# Patient Record
Sex: FEMALE | Race: BLACK OR AFRICAN AMERICAN | NOT HISPANIC OR LATINO | Employment: FULL TIME | URBAN - METROPOLITAN AREA
[De-identification: names, ages, dates, MRNs, and addresses within clinical notes are randomized per-mention and may not be internally consistent; named-entity substitution may affect disease eponyms.]

---

## 2022-07-20 ENCOUNTER — OFFICE VISIT (OUTPATIENT)
Dept: PEDIATRICS | Facility: CLINIC | Age: 27
End: 2022-07-20
Payer: COMMERCIAL

## 2022-07-20 VITALS
WEIGHT: 132.5 LBS | HEART RATE: 72 BPM | RESPIRATION RATE: 16 BRPM | TEMPERATURE: 98.2 F | DIASTOLIC BLOOD PRESSURE: 70 MMHG | HEIGHT: 67 IN | SYSTOLIC BLOOD PRESSURE: 112 MMHG | OXYGEN SATURATION: 100 % | BODY MASS INDEX: 20.8 KG/M2

## 2022-07-20 DIAGNOSIS — Z00.00 ROUTINE GENERAL MEDICAL EXAMINATION AT A HEALTH CARE FACILITY: Primary | ICD-10-CM

## 2022-07-20 DIAGNOSIS — Z23 HIGH PRIORITY FOR 2019-NCOV VACCINE: ICD-10-CM

## 2022-07-20 DIAGNOSIS — Z91.018 FOOD ALLERGY: ICD-10-CM

## 2022-07-20 DIAGNOSIS — Z13.220 SCREENING FOR HYPERLIPIDEMIA: ICD-10-CM

## 2022-07-20 DIAGNOSIS — R23.3 EASY BRUISABILITY: ICD-10-CM

## 2022-07-20 LAB
ERYTHROCYTE [DISTWIDTH] IN BLOOD BY AUTOMATED COUNT: 13.3 % (ref 10–15)
HCT VFR BLD AUTO: 39.9 % (ref 35–47)
HGB BLD-MCNC: 13.9 G/DL (ref 11.7–15.7)
MCH RBC QN AUTO: 29.4 PG (ref 26.5–33)
MCHC RBC AUTO-ENTMCNC: 34.8 G/DL (ref 31.5–36.5)
MCV RBC AUTO: 84 FL (ref 78–100)
PLATELET # BLD AUTO: 266 10E3/UL (ref 150–450)
RBC # BLD AUTO: 4.73 10E6/UL (ref 3.8–5.2)
WBC # BLD AUTO: 4.4 10E3/UL (ref 4–11)

## 2022-07-20 PROCEDURE — 80048 BASIC METABOLIC PNL TOTAL CA: CPT | Performed by: STUDENT IN AN ORGANIZED HEALTH CARE EDUCATION/TRAINING PROGRAM

## 2022-07-20 PROCEDURE — 80061 LIPID PANEL: CPT | Performed by: STUDENT IN AN ORGANIZED HEALTH CARE EDUCATION/TRAINING PROGRAM

## 2022-07-20 PROCEDURE — 36415 COLL VENOUS BLD VENIPUNCTURE: CPT | Performed by: STUDENT IN AN ORGANIZED HEALTH CARE EDUCATION/TRAINING PROGRAM

## 2022-07-20 PROCEDURE — 91306 COVID-19,PF,MODERNA (18+ YRS BOOSTER .25ML): CPT | Performed by: STUDENT IN AN ORGANIZED HEALTH CARE EDUCATION/TRAINING PROGRAM

## 2022-07-20 PROCEDURE — 99385 PREV VISIT NEW AGE 18-39: CPT | Mod: GC | Performed by: STUDENT IN AN ORGANIZED HEALTH CARE EDUCATION/TRAINING PROGRAM

## 2022-07-20 PROCEDURE — 85027 COMPLETE CBC AUTOMATED: CPT | Performed by: STUDENT IN AN ORGANIZED HEALTH CARE EDUCATION/TRAINING PROGRAM

## 2022-07-20 PROCEDURE — 0064A COVID-19,PF,MODERNA (18+ YRS BOOSTER .25ML): CPT | Performed by: STUDENT IN AN ORGANIZED HEALTH CARE EDUCATION/TRAINING PROGRAM

## 2022-07-20 SDOH — ECONOMIC STABILITY: TRANSPORTATION INSECURITY
IN THE PAST 12 MONTHS, HAS LACK OF TRANSPORTATION KEPT YOU FROM MEETINGS, WORK, OR FROM GETTING THINGS NEEDED FOR DAILY LIVING?: NO

## 2022-07-20 SDOH — ECONOMIC STABILITY: FOOD INSECURITY: WITHIN THE PAST 12 MONTHS, THE FOOD YOU BOUGHT JUST DIDN'T LAST AND YOU DIDN'T HAVE MONEY TO GET MORE.: NEVER TRUE

## 2022-07-20 SDOH — HEALTH STABILITY: PHYSICAL HEALTH: ON AVERAGE, HOW MANY DAYS PER WEEK DO YOU ENGAGE IN MODERATE TO STRENUOUS EXERCISE (LIKE A BRISK WALK)?: 3 DAYS

## 2022-07-20 SDOH — ECONOMIC STABILITY: FOOD INSECURITY: WITHIN THE PAST 12 MONTHS, YOU WORRIED THAT YOUR FOOD WOULD RUN OUT BEFORE YOU GOT MONEY TO BUY MORE.: NEVER TRUE

## 2022-07-20 SDOH — HEALTH STABILITY: PHYSICAL HEALTH: ON AVERAGE, HOW MANY MINUTES DO YOU ENGAGE IN EXERCISE AT THIS LEVEL?: 20 MIN

## 2022-07-20 SDOH — ECONOMIC STABILITY: INCOME INSECURITY: IN THE LAST 12 MONTHS, WAS THERE A TIME WHEN YOU WERE NOT ABLE TO PAY THE MORTGAGE OR RENT ON TIME?: NO

## 2022-07-20 SDOH — ECONOMIC STABILITY: INCOME INSECURITY: HOW HARD IS IT FOR YOU TO PAY FOR THE VERY BASICS LIKE FOOD, HOUSING, MEDICAL CARE, AND HEATING?: NOT HARD AT ALL

## 2022-07-20 SDOH — ECONOMIC STABILITY: TRANSPORTATION INSECURITY
IN THE PAST 12 MONTHS, HAS THE LACK OF TRANSPORTATION KEPT YOU FROM MEDICAL APPOINTMENTS OR FROM GETTING MEDICATIONS?: NO

## 2022-07-20 ASSESSMENT — ENCOUNTER SYMPTOMS
ABDOMINAL PAIN: 0
COUGH: 0
HEARTBURN: 0
HEMATOCHEZIA: 0
CONSTIPATION: 0
FEVER: 0
BREAST MASS: 0
ARTHRALGIAS: 0
NAUSEA: 0
DIARRHEA: 0
NERVOUS/ANXIOUS: 0
MYALGIAS: 0
CHILLS: 0
FREQUENCY: 0
SHORTNESS OF BREATH: 0
DYSURIA: 0
WEAKNESS: 0
DIZZINESS: 0
HEMATURIA: 0
JOINT SWELLING: 0
EYE PAIN: 0
PARESTHESIAS: 0
SORE THROAT: 0
HEADACHES: 0
PALPITATIONS: 0

## 2022-07-20 ASSESSMENT — SOCIAL DETERMINANTS OF HEALTH (SDOH)
HOW OFTEN DO YOU ATTEND CHURCH OR RELIGIOUS SERVICES?: NEVER
IN A TYPICAL WEEK, HOW MANY TIMES DO YOU TALK ON THE PHONE WITH FAMILY, FRIENDS, OR NEIGHBORS?: MORE THAN THREE TIMES A WEEK
HOW OFTEN DO YOU GET TOGETHER WITH FRIENDS OR RELATIVES?: ONCE A WEEK
ARE YOU MARRIED, WIDOWED, DIVORCED, SEPARATED, NEVER MARRIED, OR LIVING WITH A PARTNER?: NEVER MARRIED
DO YOU BELONG TO ANY CLUBS OR ORGANIZATIONS SUCH AS CHURCH GROUPS UNIONS, FRATERNAL OR ATHLETIC GROUPS, OR SCHOOL GROUPS?: NO

## 2022-07-20 ASSESSMENT — LIFESTYLE VARIABLES
SKIP TO QUESTIONS 9-10: 1
HOW OFTEN DO YOU HAVE SIX OR MORE DRINKS ON ONE OCCASION: NEVER
HOW MANY STANDARD DRINKS CONTAINING ALCOHOL DO YOU HAVE ON A TYPICAL DAY: 1 OR 2
AUDIT-C TOTAL SCORE: 3
HOW OFTEN DO YOU HAVE A DRINK CONTAINING ALCOHOL: 2-3 TIMES A WEEK

## 2022-07-20 ASSESSMENT — PAIN SCALES - GENERAL: PAINLEVEL: MODERATE PAIN (5)

## 2022-07-20 NOTE — PROGRESS NOTES
SUBJECTIVE:   CC: Cinthya Thakkar is an 26 year old woman who presents for preventive health visit.     Intermittent pleuritic pain at rest, less than a minute in duration. This occurs more often at rest, 1-2 times a week for the past few months. No pain with moderate to intense exercise.     Noticing increased bruising the past several months. No bleeding of gums, no heavier periods. No family history of coagulopathies. She does have sickle cell trait.     Has food allergies - apples, sugar snap peas, raw carrots. Feels that her lips and throat are itchy, occasional shortness of breath.     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     Got her PAP earlier this month at [facility] and reported it to be wnl.     Patient has been advised of split billing requirements and indicates understanding: Yes  Healthy Habits:     Getting at least 3 servings of Calcium per day:  NO    Bi-annual eye exam:  Yes    Dental care twice a year:  Yes    Sleep apnea or symptoms of sleep apnea:  None    Diet:  Regular (no restrictions)    Frequency of exercise:  4-5 days/week    Duration of exercise:  30-45 minutes    Taking medications regularly:  Yes    Medication side effects:  None    PHQ-2 Total Score: 0    Additional concerns today:  Yes    Chest Pain      Onset: couple of months     Description (location/character/radiation/duration): left side     Intensity:  Moderate sharp pain     Accompanying signs and symptoms:        Shortness of breath: no        Sweating: no        Nausea/vomitting: no        Palpitations: no        Other (fevers/chills/cough/heartburn/lightheadedness): YES- painful to take a deep breath     History (similar episodes/previous evaluation): None    Precipitating or alleviating factors:       Worse with exertion: no        Worse with breathing: YES- difficult to take a deep breath        Related to eating: no        Better with burping: no     Therapies tried and outcome: None      Other concerns are referral for food allergist and increase in bruising     Today's PHQ-2 Score:   PHQ-2 ( 1999 Pfizer) 7/20/2022   Q1: Little interest or pleasure in doing things 0   Q2: Feeling down, depressed or hopeless 0   PHQ-2 Score 0   Q1: Little interest or pleasure in doing things Not at all   Q2: Feeling down, depressed or hopeless Not at all   PHQ-2 Score 0       Abuse: Current or Past (Physical, Sexual or Emotional) - No  Do you feel safe in your environment? Yes    Have you ever done Advance Care Planning? (For example, a Health Directive, POLST, or a discussion with a medical provider or your loved ones about your wishes): Yes, patient states has an Advance Care Planning document and will bring a copy to the clinic.    Social History     Tobacco Use     Smoking status: Never Smoker     Smokeless tobacco: Never Used   Substance Use Topics     Alcohol use: Yes     Comment: 1-2 daily, 3 days a week     If you drink alcohol do you typically have >3 drinks per day or >7 drinks per week? No    Alcohol Use 7/20/2022   Prescreen: >3 drinks/day or >7 drinks/week? No   Prescreen: >3 drinks/day or >7 drinks/week? -     Reviewed orders with patient.  Reviewed health maintenance and updated orders accordingly - Yes    Breast Cancer Screening: NA    History of abnormal Pap smear: NO - age 21-29 PAP every 3 years recommended. Reported negative earlier this month at [facility].     Reviewed and updated as needed this visit by clinical staff   Tobacco  Allergies  Meds   Med Hx  Surg Hx  Fam Hx  Soc Hx          Reviewed and updated as needed this visit by Provider    Allergies  Meds      Soc Hx           Review of Systems   Constitutional: Negative for chills and fever.   HENT: Negative for congestion, ear pain, hearing loss and sore throat.    Eyes: Negative for pain and visual disturbance.   Respiratory: Negative for cough and shortness of breath.    Cardiovascular: Negative for chest pain,  "palpitations and peripheral edema.   Gastrointestinal: Negative for abdominal pain, constipation, diarrhea, heartburn, hematochezia and nausea.   Breasts:  Negative for tenderness, breast mass and discharge.   Genitourinary: Negative for dysuria, frequency, genital sores, hematuria, pelvic pain, urgency, vaginal bleeding and vaginal discharge.   Musculoskeletal: Negative for arthralgias, joint swelling and myalgias.   Skin: Negative for rash.   Neurological: Negative for dizziness, weakness, headaches and paresthesias.   Psychiatric/Behavioral: Negative for mood changes. The patient is not nervous/anxious.      OBJECTIVE:   /70   Pulse 72   Temp 98.2  F (36.8  C)   Resp 16   Ht 1.713 m (5' 7.44\")   Wt 60.1 kg (132 lb 8 oz)   SpO2 100%   BMI 20.48 kg/m       Physical Exam  GENERAL: healthy, alert and no distress  EYES: Eyes grossly normal to inspection, PERRL and conjunctivae and sclerae normal  HENT: ear canals and TM's normal, nose and mouth without ulcers or lesions  NECK: no adenopathy, no asymmetry, masses, or scars and thyroid normal to palpation  RESP: lungs clear to auscultation - no rales, rhonchi or wheezes  CV: RRR, no murmur appreciated, well perfused  ABDOMEN: soft, nontender, no hepatosplenomegaly, no masses and bowel sounds normal  MS: no gross musculoskeletal defects noted, no edema  SKIN: no suspicious lesions or rashes  NEURO: Normal strength and tone, mentation intact and speech normal  PSYCH: mentation appears normal, affect normal/bright    ASSESSMENT/PLAN:   Cinthya was seen today for physical and imm/inj.  Diagnoses and all orders for this visit:    Routine general medical examination at a health care facility  -     Basic metabolic panel  (Ca, Cl, CO2, Creat, Gluc, K, Na, BUN); Future    Screening for hyperlipidemia  -     Lipid panel reflex to direct LDL Fasting; Future    Easy bruisability  Patient reports easy bruisability without any other concerning bleeding such as gum " "oozing or heavier periods.   -     CBC with platelets; Future    Food allergy  -     Adult Allergy/Asthma Referral; Future    High priority for 2019-nCoV vaccine  -     COVID-19,PF,MODERNA (18+ Yrs BOOSTER .25mL)    Patient has been advised of split billing requirements and indicates understanding: Yes    COUNSELING:  Reviewed preventive health counseling, as reflected in patient instructions       Regular exercise       Healthy diet/nutrition       Vision screening       Contraception    Estimated body mass index is 20.48 kg/m  as calculated from the following:    Height as of this encounter: 1.713 m (5' 7.44\").    Weight as of this encounter: 60.1 kg (132 lb 8 oz).    She reports that she has never smoked. She has never used smokeless tobacco.    Counseling Resources:  ATP IV Guidelines  Pooled Cohorts Equation Calculator  Breast Cancer Risk Calculator  BRCA-Related Cancer Risk Assessment: FHS-7 Tool  FRAX Risk Assessment  ICSI Preventive Guidelines  Dietary Guidelines for Americans, 2010  USDA's MyPlate  ASA Prophylaxis  Lung CA Screening    The patient was seen by and discussed with Dr. Dunn.     Heaven Henao MD  [employer] [employer]    STAFF NOTE:  I have seen the patient, discussed with the resident, was present during critical portion of visit, and was available to furnish services throughout the visit.  I agree with the history, physical and plan as documented above.    Thea Dunn MD  Internal Medicine-Pediatrics      "

## 2022-07-21 LAB
ANION GAP SERPL CALCULATED.3IONS-SCNC: 6 MMOL/L (ref 3–14)
BUN SERPL-MCNC: 10 MG/DL (ref 7–30)
CALCIUM SERPL-MCNC: 9.3 MG/DL (ref 8.5–10.1)
CHLORIDE BLD-SCNC: 107 MMOL/L (ref 94–109)
CHOLEST SERPL-MCNC: 309 MG/DL
CO2 SERPL-SCNC: 23 MMOL/L (ref 20–32)
CREAT SERPL-MCNC: 0.79 MG/DL (ref 0.52–1.04)
FASTING STATUS PATIENT QL REPORTED: ABNORMAL
GFR SERPL CREATININE-BSD FRML MDRD: >90 ML/MIN/1.73M2
GLUCOSE BLD-MCNC: 99 MG/DL (ref 70–99)
HDLC SERPL-MCNC: 88 MG/DL
LDLC SERPL CALC-MCNC: 212 MG/DL
NONHDLC SERPL-MCNC: 221 MG/DL
POTASSIUM BLD-SCNC: 4.5 MMOL/L (ref 3.4–5.3)
SODIUM SERPL-SCNC: 136 MMOL/L (ref 133–144)
TRIGL SERPL-MCNC: 46 MG/DL

## 2022-08-20 ENCOUNTER — MYC MEDICAL ADVICE (OUTPATIENT)
Dept: PEDIATRICS | Facility: CLINIC | Age: 27
End: 2022-08-20

## 2022-08-22 ENCOUNTER — NURSE TRIAGE (OUTPATIENT)
Dept: PEDIATRICS | Facility: CLINIC | Age: 27
End: 2022-08-22

## 2022-08-22 NOTE — TELEPHONE ENCOUNTER
Attempted to reach patient, LVMTCB. Needs triage on symptoms within [facility] 8/20/22.     Mack WILLIAM RN

## 2022-08-22 NOTE — TELEPHONE ENCOUNTER
"Appt made for Tuesday 8/23/22 for evaluation of dizziness  Educated  pt on when to seek ER care    Pt verbalized understanding and agrees to the plan    Thank you  Cb Pino RN on 8/22/2022 at 2:12 PM    Reason for Disposition    Dizziness is main symptom    MILD dizziness (e.g., walking normally) and has NOT been evaluated by physician for this (Exception: dizziness caused by heat exposure, sudden standing, or poor fluid intake)    Answer Assessment - Initial Assessment Questions  1. SYMPTOM: \"What is the main symptom you are concerned about?\" (e.g., weakness, numbness)      lightheadedness at night for the last 5-6 days. Feels like things around her are moving  2. ONSET: \"When did this start?\" (minutes, hours, days; while sleeping)      5-6 days  3. LAST NORMAL: \"When was the last time you (the patient) were normal (no symptoms)?\"      Episodes last less than a minutes  4. PATTERN \"Does this come and go, or has it been constant since it started?\"  \"Is it present now?\"      Comes goes away.  5. CARDIAC SYMPTOMS: \"Have you had any of the following symptoms: chest pain, difficulty breathing, palpitations?\"      no  6. NEUROLOGIC SYMPTOMS: \"Have you had any of the following symptoms: headache, dizziness, vision loss, double vision, changes in speech, unsteady on your feet?\"      Feels like her vision is moving. Gets better when she closes her eyes  7. OTHER SYMPTOMS: \"Do you have any other symptoms?\"      Difficulty finding words, can't get the right words out. Last episode was in March. Has had 3 episodes in the last few years  8. PREGNANCY: \"Is there any chance you are pregnant?\" \"When was your last menstrual period?\"      Currently has her period    Answer Assessment - Initial Assessment Questions  1. DESCRIPTION: \"Describe your dizziness.\"      Pt feels lightheaded. Has happened the 5 or 6 nights. Pt works eves or nights. Lasts less than a minute  2. LIGHTHEADED: \"Do you feel lightheaded?\" (e.g., " "somewhat faint, woozy, weak upon standing)      yes  3. VERTIGO: \"Do you feel like either you or the room is spinning or tilting?\" (i.e. vertigo)      Pt feels like the room is moving around her  4. SEVERITY: \"How bad is it?\"  \"Do you feel like you are going to faint?\" \"Can you stand and walk?\"    - MILD: Feels slightly dizzy, but walking normally.    - MODERATE: Feels unsteady when walking, but not falling; interferes with normal activities (e.g., school, work).    - SEVERE: Unable to walk without falling, or requires assistance to walk without falling; feels like passing out now.       mild  5. ONSET:  \"When did the dizziness begin?\"      5 or 6 daysago  6. AGGRAVATING FACTORS: \"Does anything make it worse?\" (e.g., standing, change in head position)      no  7. HEART RATE: \"Can you tell me your heart rate?\" \"How many beats in 15 seconds?\"  (Note: not all patients can do this)        Pt unable  8. CAUSE: \"What do you think is causing the dizziness?\"      Pt is unsure  9. RECURRENT SYMPTOM: \"Have you had dizziness before?\" If Yes, ask: \"When was the last time?\" \"What happened that time?\"      no  10. OTHER SYMPTOMS: \"Do you have any other symptoms?\" (e.g., fever, chest pain, vomiting, diarrhea, bleeding)        Pt has had 3 episodes in the last few years of word finding. This last happened in March 11. PREGNANCY: \"Is there any chance you are pregnant?\" \"When was your last menstrual period?\"        No. Pt has her period    Protocols used: NEUROLOGIC DEFICIT-A-OH, DIZZINESS-A-OH      "

## 2022-08-23 ENCOUNTER — OFFICE VISIT (OUTPATIENT)
Dept: PEDIATRICS | Facility: CLINIC | Age: 27
End: 2022-08-23
Payer: COMMERCIAL

## 2022-08-23 VITALS
RESPIRATION RATE: 16 BRPM | WEIGHT: 132.1 LBS | DIASTOLIC BLOOD PRESSURE: 62 MMHG | SYSTOLIC BLOOD PRESSURE: 118 MMHG | HEART RATE: 90 BPM | BODY MASS INDEX: 20.42 KG/M2 | TEMPERATURE: 98.4 F | OXYGEN SATURATION: 99 %

## 2022-08-23 DIAGNOSIS — R11.0 NAUSEA: Primary | ICD-10-CM

## 2022-08-23 DIAGNOSIS — R47.01 APHASIA: ICD-10-CM

## 2022-08-23 DIAGNOSIS — H81.10 BENIGN PAROXYSMAL POSITIONAL VERTIGO, UNSPECIFIED LATERALITY: ICD-10-CM

## 2022-08-23 PROCEDURE — 99213 OFFICE O/P EST LOW 20 MIN: CPT | Performed by: PHYSICIAN ASSISTANT

## 2022-08-23 RX ORDER — ONDANSETRON 4 MG/1
4 TABLET, ORALLY DISINTEGRATING ORAL EVERY 8 HOURS PRN
Qty: 5 TABLET | Refills: 0 | Status: SHIPPED | OUTPATIENT
Start: 2022-08-23 | End: 2023-01-20

## 2022-08-23 ASSESSMENT — PAIN SCALES - GENERAL: PAINLEVEL: MILD PAIN (2)

## 2022-08-23 NOTE — PROGRESS NOTES
"  Assessment & Plan     Nausea  Due to BPPV.   - ondansetron (ZOFRAN ODT) 4 MG ODT tab; Take 1 tablet (4 mg) by mouth every 8 hours as needed for nausea    Benign paroxysmal positional vertigo, unspecified laterality  May take meclizine PRN. Push fluids. Avoid aggravating activities.    Aphasia  Occurred on multiple occasions. Referral to neuro for further evaluation.  - Adult Neurology  Referral; Future    Reinier Lam PA-C  [facility] KRISTOPHER Mendes is a 26 year old presenting for the following health issues:  Dizziness      History of Present Illness       Headaches:   Since the patient's last clinic visit, headaches are: worsened  The patient is getting headaches:  Not often absent the last few weeks  She is able to do normal daily activities when she has a migraine.  The patient is taking the following rescue/relief medications:  No rescue/relief medications   Patient states \"I get only a small amount of relief\" from the rescue/relief medications.   The patient is taking the following medications to prevent migraines:  No medications to prevent migraines  In the past 4 weeks, the patient has gone to an Urgent Care or Emergency Room 0 times times due to headaches.    Reason for visit:  Persistent intermittent dizziness accompanied by headache and stomach ache over 7 days, nausea starting today  Symptom onset:  3-7 days ago  Symptoms include:  Headache, dizziness, stomach ache, nausea  Symptom intensity:  Mild  Symptom progression:  Worsening  Had these symptoms before:  No  What makes it worse:  No  What makes it better:  No    She eats 0-1 servings of fruits and vegetables daily.She consumes 2 sweetened beverage(s) daily.She exercises with enough effort to increase her heart rate 30 to 60 minutes per day.  She exercises with enough effort to increase her heart rate 4 days per week.   She is taking medications regularly.       Dizziness  Onset/Duration: x 7 " days   Description:   Do you feel faint: No  Does it feel like the surroundings (bed, room) are moving: YES  Unsteady/off balance: YES  Intermittent and lasts for seconds  No triggers   Room is spinning and vision moves   Have you passed out or fallen: No  Intensity: mild  Progression of Symptoms: intermittent  Accompanying Signs & Symptoms:  Heart palpitations or chest pain: No  Nausea, vomiting: YES- nausea x today  Weakness or lack of coordination in arms or legs: No  Vision or speech changes: YES-vision spinning  Numbness or tingling: No  Ringing in ears (Tinnitus): YES- only once  Hearing Loss: No  History:   Head trauma/concussion history: No  Previous similar symptoms: No  Recent bleeding history: No  Any new medications (BP?): No  Precipitating factors:   Worse with activity: No  Worse with head movement: No  Alleviating factors:   Does staying in a fixed position give relief: YES  Therapies tried and outcome: None    No recent travel, swimming, illnesses. No allergy concerns.     Work: . Overnights this week.     No fatigue.    Review of Systems   Constitutional, HEENT, cardiovascular, pulmonary, gi and gu systems are negative, except as otherwise noted.      Objective    /62 (BP Location: Right arm, Patient Position: Sitting, Cuff Size: Adult Regular)   Pulse 90   Temp 98.4  F (36.9  C) (Temporal)   Resp 16   Wt 59.9 kg (132 lb 1.6 oz)   SpO2 99%   BMI 20.42 kg/m    Body mass index is 20.42 kg/m .  Physical Exam   GENERAL: alert and no distress  EYES: Eyes grossly normal to inspection, PERRL and conjunctivae and sclerae normal  HENT: ear canals and TM's normal, nose and mouth without ulcers or lesions  NECK: no adenopathy  RESP: lungs clear to auscultation - no rales, rhonchi or wheezes  CV: regular rate and rhythm, normal S1 S2, no S3 or S4  Neuro:  Normal strength and tone, mentation intact, speech normal and cranial nerves 2-12 intact    No results found for any visits on  08/23/22.

## 2022-09-15 ENCOUNTER — OFFICE VISIT (OUTPATIENT)
Dept: ALLERGY | Facility: CLINIC | Age: 27
End: 2022-09-15
Attending: INTERNAL MEDICINE
Payer: COMMERCIAL

## 2022-09-15 VITALS
HEART RATE: 73 BPM | WEIGHT: 137 LBS | OXYGEN SATURATION: 98 % | SYSTOLIC BLOOD PRESSURE: 115 MMHG | DIASTOLIC BLOOD PRESSURE: 76 MMHG | BODY MASS INDEX: 21.18 KG/M2

## 2022-09-15 DIAGNOSIS — Z91.018 FOOD ALLERGY: ICD-10-CM

## 2022-09-15 DIAGNOSIS — J30.1 SEASONAL ALLERGIC RHINITIS DUE TO POLLEN: ICD-10-CM

## 2022-09-15 DIAGNOSIS — T78.1XXA OTHER ADVERSE FOOD REACTIONS, NOT ELSEWHERE CLASSIFIED, INITIAL ENCOUNTER: Primary | ICD-10-CM

## 2022-09-15 PROCEDURE — 95004 PERQ TESTS W/ALRGNC XTRCS: CPT | Performed by: INTERNAL MEDICINE

## 2022-09-15 PROCEDURE — 99203 OFFICE O/P NEW LOW 30 MIN: CPT | Mod: 25 | Performed by: INTERNAL MEDICINE

## 2022-09-15 RX ORDER — MECLIZINE HYDROCHLORIDE 25 MG/1
25 TABLET ORAL 3 TIMES DAILY PRN
COMMUNITY
End: 2023-01-20

## 2022-09-15 ASSESSMENT — ENCOUNTER SYMPTOMS
NAUSEA: 0
EYE DISCHARGE: 0
WHEEZING: 0
EYE REDNESS: 0
HEADACHES: 0
RHINORRHEA: 0
ACTIVITY CHANGE: 0
ARTHRALGIAS: 0
JOINT SWELLING: 0
SHORTNESS OF BREATH: 0
MYALGIAS: 0
FEVER: 0
CHEST TIGHTNESS: 0
SINUS PRESSURE: 0
VOMITING: 0
CHILLS: 0
COUGH: 0
FACIAL SWELLING: 0
ADENOPATHY: 0
DIARRHEA: 0
EYE ITCHING: 0

## 2022-09-15 NOTE — LETTER
9/15/2022         RE: Cinthya Thakkar  [address] [id number]  [place] [facility] [address]        Dear Colleague,    Thank you for referring your patient, Cinthya Thakkar, to the [facility]. Please see a copy of my visit note below.    Cinthya Thakkar was seen in the Allergy Clinic at [facility].    Cinthya Thakkar is a 26 year old female being seen today at the request of [facility] Dunn MD [facility]  in consultation for Food allergy.    She has allergy symptoms such as mouth itching and mild throat tightening sensation to numerous foods including apples, snap peas, carrots, cherries and pears.  Avocado causes nausea.  Shrimp also causes abdominal pain 20 to 30 minutes after eating.  The fruits and vegetables cause symptoms within minutes of eating the food and last for 5 to 10 minutes.  She stops eating the food and quickly improves.  Cooked carrots do not cause any symptoms.  Baked apples causes very mild symptoms.  She has never had to present to the emergency department or urgent care for this.  She does not take any medications and symptoms quickly improve.    She has mild seasonal allergies.    History reviewed. No pertinent past medical history.  Family History   Problem Relation Age of Onset     Asthma Mother      Asthma Brother      Allergies Brother      History reviewed. No pertinent surgical history.    ENVIRONMENTAL HISTORY:   Pets inside the house include 1 dog(s).  Do you smoke cigarettes or other recreational drugs? No There is/are 0 smokers living in the house. The house does not have a damp basement.     SOCIAL HISTORY:   Cinthya is employed as  . She lives with her self.      Review of Systems   Constitutional: Negative for activity change, chills and fever.   HENT: Negative for congestion, dental problem, ear pain, facial swelling, nosebleeds, postnasal drip, rhinorrhea, sinus pressure and sneezing.    Eyes:  Negative for discharge, redness and itching.   Respiratory: Negative for cough, chest tightness, shortness of breath and wheezing.    Cardiovascular: Negative for chest pain.   Gastrointestinal: Negative for diarrhea, nausea and vomiting.   Musculoskeletal: Negative for arthralgias, joint swelling and myalgias.   Skin: Negative for rash.   Neurological: Negative for headaches.   Hematological: Negative for adenopathy.   Psychiatric/Behavioral: Negative for behavioral problems and self-injury.         Current Outpatient Medications:      etonogestrel (NEXPLANON) 68 MG IMPL, Inject 1 Device Subcutaneous, Disp: , Rfl:      meclizine (DRAMAMINE) 25 MG tablet, Take 25 mg by mouth 3 times daily as needed for dizziness, Disp: , Rfl:      ondansetron (ZOFRAN ODT) 4 MG ODT tab, Take 1 tablet (4 mg) by mouth every 8 hours as needed for nausea (Patient not taking: Reported on 9/15/2022), Disp: 5 tablet, Rfl: 0  No Known Allergies      EXAM:   /76   Pulse 73   Wt 62.1 kg (137 lb)   SpO2 98%   BMI 21.18 kg/m      Physical Exam  Constitutional:       General: She is not in acute distress.     Appearance: Normal appearance. She is not ill-appearing.   HENT:      Head: Normocephalic and atraumatic.      Nose: Nose normal. No congestion or rhinorrhea.      Mouth/Throat:      Mouth: Mucous membranes are moist.      Pharynx: Oropharynx is clear.  Mild posterior oropharyngeal erythema.   Eyes:      General:         Right eye: No discharge.         Left eye: No discharge.   Cardiovascular:      Rate and Rhythm: Normal rate and regular rhythm.      Heart sounds: Normal heart sounds.   Pulmonary:      Effort: Pulmonary effort is normal.      Breath sounds: Normal breath sounds. No wheezing or rhonchi.   Skin:     General: Skin is warm.      Findings: No erythema or rash.   Neurological:      General: No focal deficit present.      Mental Status: She is alert. Mental status is at baseline.   Psychiatric:         Mood and Affect:  Mood normal.         Behavior: Behavior normal.     WORKUP: Skin testing was positive to walnut, apple, shrimp, ragweed    FOOD ALLERGEN PERCUTANEOUS SKIN TESTING  [employer]  9/15/2022   Consent N   Ordering Physician Dr. Medina   Interpreting Physician Dr. Medina   Testing Technician GLADIS Ny   Location Back   Time start: 10:30 AM   Time End: 10:45 AM   Randle 1:20 (W/F in millimeters) 3/4   Apple 1:40 (W/F in miilimeters) 5/6   Avocado*ALK (1:10 w/v) 0   Shrimp 1:20 (W/F in millimeters) 5/6      ENVIRONMENTAL PERCUTANEOUS SKIN TESTING: ADULT  [employer] [employer] 9/15/2022   Consent N   Ordering Physician Dr. Medina   Interpreting Physician Dr. Medina   Testing Technician GLADIS Ny   Location Back   Time start: 10:30 AM   Time End: 10:45 AM   Positive Control: Histatrol*ALK 1 mg/ml 5/6   Negative Control: 50% Glycerin 0   Mark Grass (100,000 BAU/mL) 0   Birch Mix (W/F in millimeters) 0   Ragweed Mix* ALK (W/F in millimeters) 25/35   Sagebrush/Mugwort (W/F in millimeters) 0        ASSESSMENT/PLAN:  Cinthya Thakkar is a 26 year old female with symptoms to numerous foods including apples, snap peas, carrots, avocado, cherries and carrots as well as shrimp.  We discussed food pollen syndrome however her positive skin test to ragweed does not fit her story.  I expected birch mix to be positive to help explain symptoms with the apples and carrots and other fruits.    She had borderline positive results to shrimp.  Her symptoms are abdominal pain only without any other additional symptoms.    Will obtain blood test for shrimp as well as avocado, walnut and apple.  We will also assess birch and mugwort.  If the birch blood test is positive, this would help explain her reactions to the fruits and vegetables.  If the shrimp blood test is negative I do not see need for an EpiPen.  We discussed the pros and cons of the EpiPen at this time she does not feel she needs an Epipen.    Follow-up as needed.       Thank  you for allowing me to participate in the care of Cinthya Thakkar.      A total of 35 minutes was spent on the day of the encounter performing chart review, history and exam, documentation, and counseling and coordination of care as noted above.       Murali Medina MD  Allergy/Immunology  [facility]        Per provider verbal order, placed Adult Environmental Panel and foods  scratch test.  Once panels were placed, patient was monitored for 15 minutes in clinic.  Provider read test after 15 minutes..  Pt tolerated procedure well.  All questions and concerns were addressed at office visit.         Anant Vásquez MA        Again, thank you for allowing me to participate in the care of your patient.        Sincerely,        Murali Medina MD

## 2022-09-15 NOTE — PROGRESS NOTES
Cinthya Thakkar was seen in the Allergy Clinic at [facility].    Cinthya Thakkar is a 26 year old female being seen today at the request of [facility]  in consultation for Food allergy.    She has allergy symptoms such as mouth itching and mild throat tightening sensation to numerous foods including apples, snap peas, carrots, cherries and pears.  Avocado causes nausea.  Shrimp also causes abdominal pain 20 to 30 minutes after eating.  The fruits and vegetables cause symptoms within minutes of eating the food and last for 5 to 10 minutes.  She stops eating the food and quickly improves.  Cooked carrots do not cause any symptoms.  Baked apples causes very mild symptoms.  She has never had to present to the emergency department or urgent care for this.  She does not take any medications and symptoms quickly improve.    She has mild seasonal allergies.    History reviewed. No pertinent past medical history.  Family History   Problem Relation Age of Onset     Asthma Mother      Asthma Brother      Allergies Brother      History reviewed. No pertinent surgical history.    ENVIRONMENTAL HISTORY:   Pets inside the house include 1 dog(s).  Do you smoke cigarettes or other recreational drugs? No There is/are 0 smokers living in the house. The house does not have a damp basement.     SOCIAL HISTORY:   Cinthya is employed as  . She lives with her self.      Review of Systems   Constitutional: Negative for activity change, chills and fever.   HENT: Negative for congestion, dental problem, ear pain, facial swelling, nosebleeds, postnasal drip, rhinorrhea, sinus pressure and sneezing.    Eyes: Negative for discharge, redness and itching.   Respiratory: Negative for cough, chest tightness, shortness of breath and wheezing.    Cardiovascular: Negative for chest pain.   Gastrointestinal: Negative for diarrhea, nausea and vomiting.   Musculoskeletal:  Negative for arthralgias, joint swelling and myalgias.   Skin: Negative for rash.   Neurological: Negative for headaches.   Hematological: Negative for adenopathy.   Psychiatric/Behavioral: Negative for behavioral problems and self-injury.         Current Outpatient Medications:      etonogestrel (NEXPLANON) 68 MG IMPL, Inject 1 Device Subcutaneous, Disp: , Rfl:      meclizine (DRAMAMINE) 25 MG tablet, Take 25 mg by mouth 3 times daily as needed for dizziness, Disp: , Rfl:      ondansetron (ZOFRAN ODT) 4 MG ODT tab, Take 1 tablet (4 mg) by mouth every 8 hours as needed for nausea (Patient not taking: Reported on 9/15/2022), Disp: 5 tablet, Rfl: 0  No Known Allergies      EXAM:   /76   Pulse 73   Wt 62.1 kg (137 lb)   SpO2 98%   BMI 21.18 kg/m      Physical Exam  Constitutional:       General: She is not in acute distress.     Appearance: Normal appearance. She is not ill-appearing.   HENT:      Head: Normocephalic and atraumatic.      Nose: Nose normal. No congestion or rhinorrhea.      Mouth/Throat:      Mouth: Mucous membranes are moist.      Pharynx: Oropharynx is clear.  Mild posterior oropharyngeal erythema.   Eyes:      General:         Right eye: No discharge.         Left eye: No discharge.   Cardiovascular:      Rate and Rhythm: Normal rate and regular rhythm.      Heart sounds: Normal heart sounds.   Pulmonary:      Effort: Pulmonary effort is normal.      Breath sounds: Normal breath sounds. No wheezing or rhonchi.   Skin:     General: Skin is warm.      Findings: No erythema or rash.   Neurological:      General: No focal deficit present.      Mental Status: She is alert. Mental status is at baseline.   Psychiatric:         Mood and Affect: Mood normal.         Behavior: Behavior normal.     WORKUP: Skin testing was positive to walnut, apple, shrimp, ragweed    FOOD ALLERGEN PERCUTANEOUS SKIN TESTING  [facility]  9/15/2022   Consent N   Ordering Physician Dr. Medina   Interpreting Physician  Dr. Medina   Testing Technician GLADIS Ny   Location Back   Time start: 10:30 AM   Time End: 10:45 AM   [place] 1:20 (W/F in millimeters) 3/4   Apple 1:40 (W/F in miilimeters) 5/6   Avocado*ALK (1:10 w/v) 0   Shrimp 1:20 (W/F in millimeters) 5/6      ENVIRONMENTAL PERCUTANEOUS SKIN TESTING: ADULT  [facility] 9/15/2022   Consent N   Ordering Physician Dr. Medina   Interpreting Physician Dr. Medina   Testing Technician GLADIS Ny   Location Back   Time start: 10:30 AM   Time End: 10:45 AM   Positive Control: Histatrol*ALK 1 mg/ml 5/6   Negative Control: 50% Glycerin 0   Mark [place] (100,000 BAU/mL) 0   Birch Mix (W/F in millimeters) 0   Ragweed Mix* ALK (W/F in millimeters) 25/35   Sagebrush/Mugwort (W/F in millimeters) 0        ASSESSMENT/PLAN:  Cinthya Thakkar is a 26 year old female with symptoms to numerous foods including apples, snap peas, carrots, avocado, cherries and carrots as well as shrimp.  We discussed food pollen syndrome however her positive skin test to ragweed does not fit her story.  I expected birch mix to be positive to help explain symptoms with the apples and carrots and other fruits.    She had borderline positive results to shrimp.  Her symptoms are abdominal pain only without any other additional symptoms.    Will obtain blood test for shrimp as well as avocado, walnut and apple.  We will also assess birch and mugwort.  If the birch blood test is positive, this would help explain her reactions to the fruits and vegetables.  If the shrimp blood test is negative I do not see need for an EpiPen.  We discussed the pros and cons of the EpiPen at this time she does not feel she needs an Epipen.    Follow-up as needed.       Thank you for allowing me to participate in the care of Cinthya Thakkar.      A total of 35 minutes was spent on the day of the encounter performing chart review, history and exam, documentation, and counseling and coordination of care as noted above.        Murali Medina MD  Allergy/Immunology  [facility]

## 2022-09-15 NOTE — PATIENT INSTRUCTIONS
Allergy Staff Appt Hours Shot Hours Location         Physician   Murali Medina MD      Support Staff   BARB Wharton RN Carlos Q., MA Amber G., MA       Mondays  Not available until January/2023 Wednesdays  Close    Tuesdays Thursdays and Fridays:  [place] 7-5                    [place]     Tuesdays: 7:40-3:20      Fridays: 7:40-4:20                [facility]  [address]  [place] [address]  Appt Line: [phone]    Pulmonary Function Scheduling:  [place]: [phone]         Questions about cost of your care?  For questions about your cost of your visit, procedure, lab or imaging contact: [facility] Price Line [phone] or visit: [email or web address]    Important Scheduling Information  Appointments for skin testing: Appointment will last approximately 45 minutes.  Please call the appointment line for your clinic to schedule.  Discontinue oral antihistamines 7 days prior to the appointment.  Discontinue nasal and ocular antihistamines 4 days prior to appointment.    Appointments for challenges (oral food challenge, penicillin testing, aspirin desensitization) If your provider instructs you to that this additional testing is indicated, it will need to be scheduled directly through the allergy department.  Please contact them via [employer] or by calling the clinic and asking to speak with the allergy team.  They will provide additional information and instructions for the appointment.  Discontinue oral antihistamines 7 days prior to the appointment.  Discontinue nasal and ocular antihistamines 4 days prior to the appointment.    Thank you for trusting us with your care. Please feel free to contact us with any questions or concerns you may have.

## 2022-09-15 NOTE — PROGRESS NOTES
Per provider verbal order, placed Adult Environmental Panel and foods  scratch test.  Once panels were placed, patient was monitored for 15 minutes in clinic.  Provider read test after 15 minutes..  Pt tolerated procedure well.  All questions and concerns were addressed at office visit.         Anant Vásquez MA

## 2022-09-19 ENCOUNTER — LAB (OUTPATIENT)
Dept: LAB | Facility: CLINIC | Age: 27
End: 2022-09-19
Payer: COMMERCIAL

## 2022-09-19 DIAGNOSIS — T78.1XXA OTHER ADVERSE FOOD REACTIONS, NOT ELSEWHERE CLASSIFIED, INITIAL ENCOUNTER: ICD-10-CM

## 2022-09-19 PROCEDURE — 86003 ALLG SPEC IGE CRUDE XTRC EA: CPT | Mod: 59

## 2022-09-19 PROCEDURE — 99000 SPECIMEN HANDLING OFFICE-LAB: CPT

## 2022-09-19 PROCEDURE — 86003 ALLG SPEC IGE CRUDE XTRC EA: CPT | Mod: 90

## 2022-09-19 PROCEDURE — 86003 ALLG SPEC IGE CRUDE XTRC EA: CPT

## 2022-09-19 PROCEDURE — 36415 COLL VENOUS BLD VENIPUNCTURE: CPT

## 2022-09-21 LAB
APPLE IGE QN: 22.9 KU(A)/L
AVOCADO IGE QN: 0.19 KU/L
DEPRECATED MISC ALLERGEN IGE RAST QL: NORMAL
MUGWORT IGE QN: 1.05 KU(A)/L
SHRIMP IGE QN: 0.36 KU(A)/L
SILVER BIRCH IGE QN: 79.9 KU(A)/L
TIMOTHY IGE QN: 2.21 KU(A)/L
WALNUT IGE QN: 0.45 KU(A)/L

## 2022-10-22 ENCOUNTER — HEALTH MAINTENANCE LETTER (OUTPATIENT)
Age: 27
End: 2022-10-22

## 2023-01-19 NOTE — PROGRESS NOTES
INITIAL NEUROLOGY CONSULTATION    DATE OF VISIT: 1/20/2023  CLINIC LOCATION: [facility]  MRN: [id number]  PATIENT NAME: Cinthya Thakkar  YOB: 1995    REASON FOR VISIT: No chief complaint on file.    HISTORY OF PRESENT ILLNESS:                                                    Ms. Cinthya Thakkar is 27 year old right handed female patient with past medical history of sickle cell trait, who was seen today for speech/language difficulty.    Per patient's report, she experienced 3-4 episodes of being unable to translate thought to speech in the past year and a half.  Rather than coming out and the intended sentences, her speech was unorganized and nonsensical.  Worst of these episodes would last several minutes.  Also reports fatigue, racing thoughts, unorganized thoughts, and difficulty communicating as fast as she thinks.    At the present time, she has no symptoms.  She also denies any focal neurological complaints.    Family history is positive for possible neuropathy.    Laboratory evaluation from July 2022 includes unremarkable BMP, elevated LDL (212), and normal CBC.  Allergy testing was done September 2020.    No prior brain imaging.    No additional useful information is available in Care Everywhere, which was reviewed.  PAST MEDICAL/SURGICAL HISTORY:                                                    I personally reviewed patient's past medical and surgical history with the patient at today's visit.  MEDICATIONS:                                                    I personally reviewed patient's medications and allergies with the patient at today's visit.  ALLERGIES:                                                    No Known Allergies  EXAM:                                                    VITAL SIGNS:   There were no vitals taken for this visit.  Mini-Cog Assessment:       General: pt is in NAD, cooperative.  Skin: normal turgor, moist mucous membranes, no  lesions/rashes noticed.  HEENT: ATNC, EOMI, PERRL, white sclera, normal conjunctiva, no nystagmus or ptosis. No carotid bruits bilaterally.  Respiratory: lung sounds clear to auscultation bilaterally, no crackles, wheezes, rhonchi. Symmetric lung excursion, no accessory respiratory muscle use.  Cardiovascular: normal S1/S2, no murmurs/rubs/gallops.   Abdomen: Not distended.  : deferred.    Neurological:  Mental: alert, follows commands,  /5 with ***/3 on memory recall, no aphasia or dysarthria. Fund of knowledge is {MYAPPROPRIATE:818572}  Cranial Nerves:  CN II: visual acuity - able to accurately count fingers with each eye. Visual fields intact, fundi: discs sharp, no papilledema and normal vessels bilaterally.  CN III, IV, VI: EOM intact, pupils equal and reactive  CN V: facial sensation nl  CN VII: face symmetric, no facial droop  CN VIII: hearing normal  CN IX: palate elevation symmetric, uvula at midline  CN XI SCM normal, shoulder shrug nl  CN XII: tongue midline  Motor: Strength: 5/5 in all major groups of all extremities. Normal tone. No abnormal movements. No pronator drift b/l.  Reflexes: Triceps, biceps, brachioradialis, patellar, and achilles reflexes normal and symmetric. No clonus noted. Toes are down-going b/l.   Sensory: light touch, pinprick, and vibration intact. Romberg: negative.  Coordination: FNF and heel-shin tests intact b/l.   Gait:  Normal, able to tandem walk *** without difficulty.  DATA:   LABS/EEG/IMAGING/OTHER STUDIES: I reviewed pertinent medical records, as detailed in the history of present illness.  ASSESSMENT AND PLAN:      ASSESSMENT: Cinthya Thakkar is a 27 year old female patient with listed above past medical history, who presents with ***.    We had a detailed discussion with the patient regarding her presenting complaints.  The neurological exam today is ***.    DIAGNOSES:  No diagnosis found.  PLAN: There are no Patient Instructions on file for this visit.    Total  Time: *** minutes spent on the date of the encounter doing chart review, history and exam, documentation and further activities per the note.    Jerel Monreal MD  [facility] Neurology  (Chart documentation was completed in part with Dragon voice-recognition software. Even though reviewed, some grammatical, spelling, and word errors may remain.)

## 2023-01-20 ENCOUNTER — OFFICE VISIT (OUTPATIENT)
Dept: NEUROLOGY | Facility: CLINIC | Age: 28
End: 2023-01-20
Attending: PHYSICIAN ASSISTANT
Payer: COMMERCIAL

## 2023-01-20 VITALS — HEART RATE: 67 BPM | SYSTOLIC BLOOD PRESSURE: 106 MMHG | OXYGEN SATURATION: 99 % | DIASTOLIC BLOOD PRESSURE: 69 MMHG

## 2023-01-20 DIAGNOSIS — R47.89 SPELL OF CHANGE IN SPEECH: Primary | ICD-10-CM

## 2023-01-20 PROCEDURE — 99204 OFFICE O/P NEW MOD 45 MIN: CPT | Performed by: PSYCHIATRY & NEUROLOGY

## 2023-01-20 NOTE — PROGRESS NOTES
INITIAL NEUROLOGY CONSULTATION    DATE OF VISIT: 1/20/2023  CLINIC LOCATION: [facility]  MRN: [id number]  PATIENT NAME: Cinthya Thakkar  YOB: 1995    REASON FOR VISIT:   Chief Complaint   Patient presents with     Speech Problem     Experienced several episodes of inability to speak over the last 3 years.     HISTORY OF PRESENT ILLNESS:                                                    Ms. Cinthya Thakkar is 27 year old right handed female patient with past medical history of sickle cell trait, who was seen today for speech/language difficulty.    Per patient's report, she experienced 3-4 episodes of being unable to translate thought to speech in the past 2 years.  The last episode happened approximately in March 2022.  No recurrence since.  They are brief lasting for 45 seconds to 1 minute, but worst one lasted several minutes.  Rather than coming out as the intended sentences, her speech is unorganized and nonsensical.  No alteration of consciousness or other associated symptoms.  On review of systems, she reports fatigue, racing thoughts, unorganized thoughts, and difficulty communicating as fast as she thinks.  Denies prior history of head injuries, seizures, or CNS infections.  No family history of neurological conditions.    At the present time, she has no symptoms.  She also denies any focal neurological complaints.    Family history is positive for possible neuropathy.    Laboratory evaluation from July 2022 includes unremarkable BMP, elevated LDL (212), and normal CBC.  Allergy testing was done September 2020.    No prior brain imaging.    No additional useful information is available in Care Everywhere, which was reviewed.  PAST MEDICAL/SURGICAL HISTORY:                                                    I personally reviewed patient's past medical and surgical history with the patient at today's visit.  MEDICATIONS:                                                     I personally reviewed patient's medications and allergies with the patient at today's visit.  ALLERGIES:                                                    No Known Allergies  EXAM:                                                    VITAL SIGNS:   /69 (BP Location: Left arm, Patient Position: Sitting, Cuff Size: Adult Regular)   Pulse 67   SpO2 99%   Mini-Cog Assessment:  Mini Cog Assessment  Clock Draw Score: 2 Normal  3 Item Recall: 2 objects recalled  Mini Cog Total Score: 4  Administered by: : Thea DELAROSA    General: pt is in NAD, cooperative.  Skin: normal turgor, moist mucous membranes, no lesions/rashes noticed.  HEENT: ATNC, EOMI, PERRL, white sclera, normal conjunctiva, no nystagmus or ptosis. No carotid bruits bilaterally.  Respiratory: lung sounds clear to auscultation bilaterally, no crackles, wheezes, rhonchi. Symmetric lung excursion, no accessory respiratory muscle use.  Cardiovascular: normal S1/S2, no murmurs/rubs/gallops.   Abdomen: Not distended.  : deferred.    Neurological:  Mental: alert, follows commands, Mini Cog Total Score: 4/5 with 2/3 on memory recall, no aphasia or dysarthria. Fund of knowledge is appropriate for age.  Cranial Nerves:  CN II: visual acuity - able to accurately count fingers with each eye. Visual fields intact, fundi: discs sharp, no papilledema and normal vessels bilaterally.  CN III, IV, VI: EOM intact, pupils equal and reactive  CN V: facial sensation nl  CN VII: face symmetric, no facial droop  CN VIII: hearing normal  CN IX: palate elevation symmetric, uvula at midline  CN XI SCM normal, shoulder shrug nl  CN XII: tongue midline  Motor: Strength: 5/5 in all major groups of all extremities. Normal tone. No abnormal movements. No pronator drift b/l.  Reflexes: Triceps, biceps, brachioradialis, patellar, and achilles reflexes normal and symmetric. No clonus noted. Toes are down-going b/l.   Sensory: light touch, pinprick, and vibration intact. Romberg:  negative.  Coordination: FNF and heel-shin tests intact b/l.   Gait:  Normal, able to tandem walk without difficulty.  DATA:   LABS/EEG/IMAGING/OTHER STUDIES: I reviewed pertinent medical records, as detailed in the history of present illness.  ASSESSMENT AND PLAN:      ASSESSMENT: Cinthya Thakkar is a 27 year old female patient with listed above past medical history, who presents with transient brief episodes of difficulty speaking that occurred over the last 2 years.    We had a detailed discussion with the patient regarding her presenting complaints.  The neurological exam today is non-focal.  We discussed that these spells might be due to neurodevelopmental conditions, mental health conditions, and focal seizures.  For further diagnostic clarification, I ordered brain MRI with and without contrast and sleep deprived EEG.    DIAGNOSES:    ICD-10-CM    1. Spell of change in speech  R47.89 Adult Neurology  Referral        PLAN: At today's visit we thoroughly discussed various diagnostic possibilities for patient's symptoms, necessary evaluation, and the plan, which includes:  Orders Placed This Encounter   Procedures     MR Brain w/o & w Contrast     EEG     No new medications.     Additional recommendations after the work-up.    Next follow-up appointment is in the next 4-6 weeks or earlier if needed.    Total Time: 48 minutes spent on the date of the encounter doing chart review, history and exam, documentation and further activities per the note.    Jerel Monreal MD  [facility] Neurology  (Chart documentation was completed in part with Dragon voice-recognition software. Even though reviewed, some grammatical, spelling, and word errors may remain.)

## 2023-01-20 NOTE — PROGRESS NOTES
"Cinthya Thakkar is a 27 year old female who presents for:  Chief Complaint   Patient presents with     Speech Problem     Experienced several episodes of inability to speak over the last 3 years.        Initial Vitals:  /69 (BP Location: Left arm, Patient Position: Sitting, Cuff Size: Adult Regular)   Pulse 67   SpO2 99%  Estimated body mass index is 21.18 kg/m  as calculated from the following:    Height as of 7/20/22: 1.713 m (5' 7.44\").    Weight as of 9/15/22: 62.1 kg (137 lb).. There is no height or weight on file to calculate BSA. BP completed using cuff size: regular        Thea Torres  "

## 2023-01-20 NOTE — PATIENT INSTRUCTIONS
AFTER VISIT SUMMARY (AVS):    At today's visit we thoroughly discussed various diagnostic possibilities for your symptoms, necessary evaluation, and the plan, which includes:  Orders Placed This Encounter   Procedures    MR Brain w/o & w Contrast    EEG     No new medications.     Additional recommendations after the work-up.    Next follow-up appointment is in the next 4-6 weeks or earlier if needed.    Please do not hesitate to call me with any questions or concerns.    Thanks.

## 2023-01-20 NOTE — LETTER
1/20/2023         RE: Cinthya Thakkar  [address] [id number]  [facility] [address]        Dear Colleague,    Thank you for referring your patient, Cinthya Thakkar, to the [facility] NEUROLOGY [facility]. Please see a copy of my visit note below.    INITIAL NEUROLOGY CONSULTATION    DATE OF VISIT: 1/20/2023  CLINIC LOCATION: [facility]  MRN: [id number]  PATIENT NAME: Cinthya Thakkar  YOB: 1995    REASON FOR VISIT:   Chief Complaint   Patient presents with     Speech Problem     Experienced several episodes of inability to speak over the last 3 years.     HISTORY OF PRESENT ILLNESS:                                                    Ms. Cinthya Thakkar is 27 year old right handed female patient with past medical history of sickle cell trait, who was seen today for speech/language difficulty.    Per patient's report, she experienced 3-4 episodes of being unable to translate thought to speech in the past 2 years.  The last episode happened approximately in March 2022.  No recurrence since.  They are brief lasting for 45 seconds to 1 minute, but worst one lasted several minutes.  Rather than coming out as the intended sentences, her speech is unorganized and nonsensical.  No alteration of consciousness or other associated symptoms.  On review of systems, she reports fatigue, racing thoughts, unorganized thoughts, and difficulty communicating as fast as she thinks.  Denies prior history of head injuries, seizures, or CNS infections.  No family history of neurological conditions.    At the present time, she has no symptoms.  She also denies any focal neurological complaints.    Family history is positive for possible neuropathy.    Laboratory evaluation from July 2022 includes unremarkable BMP, elevated LDL (212), and normal CBC.  Allergy testing was done September 2020.    No prior brain imaging.    No additional useful information is available in Care Everywhere,  which was reviewed.  PAST MEDICAL/SURGICAL HISTORY:                                                    I personally reviewed patient's past medical and surgical history with the patient at today's visit.  MEDICATIONS:                                                    I personally reviewed patient's medications and allergies with the patient at today's visit.  ALLERGIES:                                                    No Known Allergies  EXAM:                                                    VITAL SIGNS:   /69 (BP Location: Left arm, Patient Position: Sitting, Cuff Size: Adult Regular)   Pulse 67   SpO2 99%   Mini-Cog Assessment:  Mini Cog Assessment  Clock Draw Score: 2 Normal  3 Item Recall: 2 objects recalled  Mini Cog Total Score: 4  Administered by: : Thea DELAROSA    General: pt is in NAD, cooperative.  Skin: normal turgor, moist mucous membranes, no lesions/rashes noticed.  HEENT: ATNC, EOMI, PERRL, white sclera, normal conjunctiva, no nystagmus or ptosis. No carotid bruits bilaterally.  Respiratory: lung sounds clear to auscultation bilaterally, no crackles, wheezes, rhonchi. Symmetric lung excursion, no accessory respiratory muscle use.  Cardiovascular: normal S1/S2, no murmurs/rubs/gallops.   Abdomen: Not distended.  : deferred.    Neurological:  Mental: alert, follows commands, Mini Cog Total Score: 4/5 with 2/3 on memory recall, no aphasia or dysarthria. Fund of knowledge is appropriate for age.  Cranial Nerves:  CN II: visual acuity - able to accurately count fingers with each eye. Visual fields intact, fundi: discs sharp, no papilledema and normal vessels bilaterally.  CN III, IV, VI: EOM intact, pupils equal and reactive  CN V: facial sensation nl  CN VII: face symmetric, no facial droop  CN VIII: hearing normal  CN IX: palate elevation symmetric, uvula at midline  CN XI SCM normal, shoulder shrug nl  CN XII: tongue midline  Motor: Strength: 5/5 in all major groups of all extremities. Normal  tone. No abnormal movements. No pronator drift b/l.  Reflexes: Triceps, biceps, brachioradialis, patellar, and achilles reflexes normal and symmetric. No clonus noted. Toes are down-going b/l.   Sensory: light touch, pinprick, and vibration intact. Romberg: negative.  Coordination: FNF and heel-shin tests intact b/l.   Gait:  Normal, able to tandem walk without difficulty.  DATA:   LABS/EEG/IMAGING/OTHER STUDIES: I reviewed pertinent medical records, as detailed in the history of present illness.  ASSESSMENT AND PLAN:      ASSESSMENT: Cinthya Thakkar is a 27 year old female patient with listed above past medical history, who presents with transient brief episodes of difficulty speaking that occurred over the last 2 years.    We had a detailed discussion with the patient regarding her presenting complaints.  The neurological exam today is non-focal.  We discussed that these spells might be due to neurodevelopmental conditions, mental health conditions, and focal seizures.  For further diagnostic clarification, I ordered brain MRI with and without contrast and sleep deprived EEG.    DIAGNOSES:    ICD-10-CM    1. Spell of change in speech  R47.89 Adult Neurology  Referral        PLAN: At today's visit we thoroughly discussed various diagnostic possibilities for patient's symptoms, necessary evaluation, and the plan, which includes:  Orders Placed This Encounter   Procedures     MR Brain w/o & w Contrast     EEG     No new medications.     Additional recommendations after the work-up.    Next follow-up appointment is in the next 4-6 weeks or earlier if needed.    Total Time: 48 minutes spent on the date of the encounter doing chart review, history and exam, documentation and further activities per the note.    Jerel Monreal MD  [facility] Neurology  (Chart documentation was completed in part with Dragon voice-recognition software. Even though reviewed, some grammatical, spelling, and word  "errors may remain.)            Cinthya Thakkar is a 27 year old female who presents for:  Chief Complaint   Patient presents with     Speech Problem     Experienced several episodes of inability to speak over the last 3 years.        Initial Vitals:  /69 (BP Location: Left arm, Patient Position: Sitting, Cuff Size: Adult Regular)   Pulse 67   SpO2 99%  Estimated body mass index is 21.18 kg/m  as calculated from the following:    Height as of 7/20/22: 1.713 m (5' 7.44\").    Weight as of 9/15/22: 62.1 kg (137 lb).. There is no height or weight on file to calculate BSA. BP completed using cuff size: regular        Thea Torres      Again, thank you for allowing me to participate in the care of your patient.        Sincerely,        Jerel Monreal MD    "

## 2023-01-26 ENCOUNTER — HOSPITAL ENCOUNTER (OUTPATIENT)
Dept: MRI IMAGING | Facility: CLINIC | Age: 28
Discharge: HOME | End: 2023-01-26
Attending: PSYCHIATRY & NEUROLOGY | Admitting: PSYCHIATRY & NEUROLOGY
Payer: COMMERCIAL

## 2023-01-26 DIAGNOSIS — R47.89 SPELL OF CHANGE IN SPEECH: ICD-10-CM

## 2023-01-26 PROCEDURE — 255N000002 HC RX 255 OP 636: Performed by: PSYCHIATRY & NEUROLOGY

## 2023-01-26 PROCEDURE — 70553 MRI BRAIN STEM W/O & W/DYE: CPT

## 2023-01-26 PROCEDURE — A9585 GADOBUTROL INJECTION: HCPCS | Performed by: PSYCHIATRY & NEUROLOGY

## 2023-01-26 RX ORDER — GADOBUTROL 604.72 MG/ML
6.5 INJECTION INTRAVENOUS ONCE
Status: COMPLETED | OUTPATIENT
Start: 2023-01-26 | End: 2023-01-26

## 2023-01-26 RX ADMIN — GADOBUTROL 6.5 ML: 604.72 INJECTION INTRAVENOUS at 06:32

## 2023-02-06 ENCOUNTER — ANCILLARY PROCEDURE (OUTPATIENT)
Dept: NEUROLOGY | Facility: CLINIC | Age: 28
End: 2023-02-06
Attending: PSYCHIATRY & NEUROLOGY
Payer: COMMERCIAL

## 2023-02-06 DIAGNOSIS — R47.89 SPELL OF CHANGE IN SPEECH: ICD-10-CM

## 2023-02-09 ENCOUNTER — TELEPHONE (OUTPATIENT)
Dept: NEUROLOGY | Facility: CLINIC | Age: 28
End: 2023-02-09
Payer: COMMERCIAL

## 2023-02-23 NOTE — PROGRESS NOTES
ESTABLISHED PATIENT NEUROLOGY NOTE    DATE OF VISIT: 2/24/2023  CLINIC LOCATION: [facility]  MRN: [id number]  PATIENT NAME: Cinthya Thakkar  YOB: 1995    REASON FOR VISIT: No chief complaint on file.    SUBJECTIVE:                                                      HISTORY OF PRESENT ILLNESS: Patient is here to follow up regarding transient brief episodes of difficulty speaking. Please refer to my initial note from 1/20/2023 for further information.    Since the last visit, the patient reports ***.  She denies interval development of new focal neurological symptoms.    Sleep deprived EEG from 2/6/2023 was normal.    Brain MRI with and without contrast from 1/26/2023 demonstrated small foci of nonenhancing T2 signal hyperintensity in anterior right corona radiator and subcortical white matter of adjacent right frontal operculum, felt nonspecific, possibly related to migraine headaches, but atypical for demyelinating disease.  Images were personally reviewed and independently interpreted.  EXAM:                                                    Physical Exam:   Vitals: There were no vitals taken for this visit.    General: pt is in NAD, cooperative.  Skin: normal turgor, moist mucous membranes, no lesions/rashes noticed.  HEENT: ATNC, white sclera, normal conjunctiva.  Respiratory: Symmetric lung excursion, no accessory respiratory muscle use.  Abdomen: Non distended.  Neurological: awake, cooperative, follows commands, no aphasia or dysarthria noted, cranial nerves II-XII: no ptosis, extraocular motility is full, face is symmetric, tongue is midline, equally moves all extremities, normal tone in upper and lower extremities, sensation to the light touch is intact bilaterally, pronator drift is negative, finger to nose intact bilaterally, casual gait is normal.  ASSESSMENT AND PLAN:                                                    Assessment: 27 year old female patient  presents for follow-up of transient brief episodes of difficulty speaking that occurred over the last 2 years after the completion of recommended work-up (EEG and brain MRI), which were unrevealing.  We discussed the results together.  The evaluation so far is unrevealing.  I would not recommend additional neurologic testing.  I advised the patient to optimize her mental health conditions while monitoring symptoms.    Diagnoses:    ICD-10-CM    1. Spell of change in speech  R47.89         Plan:  There are no Patient Instructions on file for this visit.    Total Time: *** minutes spent on the date of the encounter doing chart review, history and exam, documentation and further activities per the note.    Jerel Monreal MD  [facility] Neurology  (Chart documentation was completed in part with Dragon voice-recognition software. Even though reviewed, some grammatical, spelling, and word errors may remain.)

## 2023-02-24 ENCOUNTER — OFFICE VISIT (OUTPATIENT)
Dept: NEUROLOGY | Facility: CLINIC | Age: 28
End: 2023-02-24
Payer: COMMERCIAL

## 2023-02-24 VITALS — HEART RATE: 77 BPM | OXYGEN SATURATION: 99 % | DIASTOLIC BLOOD PRESSURE: 74 MMHG | SYSTOLIC BLOOD PRESSURE: 107 MMHG

## 2023-02-24 DIAGNOSIS — R47.89 SPELL OF CHANGE IN SPEECH: Primary | ICD-10-CM

## 2023-02-24 PROCEDURE — 99213 OFFICE O/P EST LOW 20 MIN: CPT | Performed by: PSYCHIATRY & NEUROLOGY

## 2023-02-24 NOTE — PROGRESS NOTES
ESTABLISHED PATIENT NEUROLOGY NOTE    DATE OF VISIT: 2/24/2023  CLINIC LOCATION: [facility]  MRN: [id number]  PATIENT NAME: Cinthya Thakkar  YOB: 1995    REASON FOR VISIT:   Chief Complaint   Patient presents with     Follow Up     Spell of change in Speech      SUBJECTIVE:                                                      HISTORY OF PRESENT ILLNESS: Patient is here to follow up regarding transient brief episodes of difficulty speaking. Please refer to my initial note from 1/20/2023 for further information.    Since the last visit, the patient reports no major episodes.  She denies interval development of new focal neurological symptoms.    Sleep deprived EEG from 2/6/2023 was normal.    Brain MRI with and without contrast from 1/26/2023 demonstrated small foci of non-enhancing T2 signal hyperintensity in anterior right corona radiata and subcortical white matter of adjacent right frontal operculum, felt nonspecific, possibly related to migraine headaches, but atypical for demyelinating disease.  Images were personally reviewed and independently interpreted.  EXAM:                                                    Physical Exam:   Vitals: /74 (BP Location: Right arm, Patient Position: Sitting, Cuff Size: Adult Regular)   Pulse 77   SpO2 99%     General: pt is in NAD, cooperative.  Skin: normal turgor, moist mucous membranes, no lesions/rashes noticed.  HEENT: ATNC, white sclera, normal conjunctiva.  Respiratory: Symmetric lung excursion, no accessory respiratory muscle use.  Abdomen: Non distended.  Neurological: awake, cooperative, follows commands, no exam changes compared to the initial visit.  ASSESSMENT AND PLAN:                                                    Assessment: 27 year old female patient presents for follow-up of transient brief episodes of difficulty speaking that occurred over the last 2 years after the completion of recommended work-up (EEG  and brain MRI), which were unrevealing.  We discussed the results together.  The evaluation so far is unrevealing.  I would not recommend additional neurologic testing, but we discussed option of neuropsychology evaluation and antiseizure medication trial.  We decided to monitor her symptoms over time and pursue additional evaluation if symptoms worsen.    Diagnoses:    ICD-10-CM    1. Spell of change in speech  R47.89         Plan: At today's visit we thoroughly discussed current symptoms, evaluation results, the plan.    We discussed option of neuropsychologic testing, but decided to monitor her symptoms over time.  Advised the patient to come back if they worsen or she develop new neurological symptoms.    Next follow-up appointment is on as needed basis.    Total Time: 26 minutes spent on the date of the encounter doing chart review, history and exam, documentation and further activities per the note.    Jerel Monreal MD  [facility] Neurology  (Chart documentation was completed in part with Dragon voice-recognition software. Even though reviewed, some grammatical, spelling, and word errors may remain.)

## 2023-02-24 NOTE — LETTER
2/24/2023         RE: Cinthya Thakkar  [address] [id number]  [facility] [address]        Dear Colleague,    Thank you for referring your patient, Cinthya Thakkar, to the [facility] NEUROLOGY CLINICS [facility]. Please see a copy of my visit note below.    ESTABLISHED PATIENT NEUROLOGY NOTE    DATE OF VISIT: 2/24/2023  CLINIC LOCATION: [facility]  MRN: [id number]  PATIENT NAME: Cinthya Thakkar  YOB: 1995    REASON FOR VISIT:   Chief Complaint   Patient presents with     Follow Up     Spell of change in Speech      SUBJECTIVE:                                                      HISTORY OF PRESENT ILLNESS: Patient is here to follow up regarding transient brief episodes of difficulty speaking. Please refer to my initial note from 1/20/2023 for further information.    Since the last visit, the patient reports no major episodes.  She denies interval development of new focal neurological symptoms.    Sleep deprived EEG from 2/6/2023 was normal.    Brain MRI with and without contrast from 1/26/2023 demonstrated small foci of non-enhancing T2 signal hyperintensity in anterior right corona radiata and subcortical white matter of adjacent right frontal operculum, felt nonspecific, possibly related to migraine headaches, but atypical for demyelinating disease.  Images were personally reviewed and independently interpreted.  EXAM:                                                    Physical Exam:   Vitals: /74 (BP Location: Right arm, Patient Position: Sitting, Cuff Size: Adult Regular)   Pulse 77   SpO2 99%     General: pt is in NAD, cooperative.  Skin: normal turgor, moist mucous membranes, no lesions/rashes noticed.  HEENT: ATNC, white sclera, normal conjunctiva.  Respiratory: Symmetric lung excursion, no accessory respiratory muscle use.  Abdomen: Non distended.  Neurological: awake, cooperative, follows commands, no exam changes compared to the initial visit.  ASSESSMENT  "AND PLAN:                                                    Assessment: 27 year old female patient presents for follow-up of transient brief episodes of difficulty speaking that occurred over the last 2 years after the completion of recommended work-up (EEG and brain MRI), which were unrevealing.  We discussed the results together.  The evaluation so far is unrevealing.  I would not recommend additional neurologic testing, but we discussed option of neuropsychology evaluation and antiseizure medication trial.  We decided to monitor her symptoms over time and pursue additional evaluation if symptoms worsen.    Diagnoses:    ICD-10-CM    1. Spell of change in speech  R47.89         Plan: At today's visit we thoroughly discussed current symptoms, evaluation results, the plan.    We discussed option of neuropsychologic testing, but decided to monitor her symptoms over time.  Advised the patient to come back if they worsen or she develop new neurological symptoms.    Next follow-up appointment is on as needed basis.    Total Time: 26 minutes spent on the date of the encounter doing chart review, history and exam, documentation and further activities per the note.    Jerel Monreal MD  [facility] Neurology  (Chart documentation was completed in part with Dragon voice-recognition software. Even though reviewed, some grammatical, spelling, and word errors may remain.)      Cinthya Thakkar is a 27 year old female who presents for:  Chief Complaint   Patient presents with     Follow Up     Spell of change in Speech         Initial Vitals:  /74 (BP Location: Right arm, Patient Position: Sitting, Cuff Size: Adult Regular)   Pulse 77   SpO2 99%  Estimated body mass index is 21.18 kg/m  as calculated from the following:    Height as of 7/20/22: 1.713 m (5' 7.44\").    Weight as of 9/15/22: 62.1 kg (137 lb).. There is no height or weight on file to calculate BSA. BP completed using cuff size: " regular        Thea Torres      Again, thank you for allowing me to participate in the care of your patient.        Sincerely,        Jerel Monreal MD

## 2023-02-24 NOTE — PROGRESS NOTES
"Cinthya Thakkar is a 27 year old female who presents for:  Chief Complaint   Patient presents with     Follow Up     Spell of change in Speech         Initial Vitals:  /74 (BP Location: Right arm, Patient Position: Sitting, Cuff Size: Adult Regular)   Pulse 77   SpO2 99%  Estimated body mass index is 21.18 kg/m  as calculated from the following:    Height as of 7/20/22: 1.713 m (5' 7.44\").    Weight as of 9/15/22: 62.1 kg (137 lb).. There is no height or weight on file to calculate BSA. BP completed using cuff size: regular        Thea Torres  "

## 2023-08-27 ENCOUNTER — HEALTH MAINTENANCE LETTER (OUTPATIENT)
Age: 28
End: 2023-08-27

## 2024-10-20 ENCOUNTER — HEALTH MAINTENANCE LETTER (OUTPATIENT)
Age: 29
End: 2024-10-20